# Patient Record
Sex: MALE | Race: BLACK OR AFRICAN AMERICAN | NOT HISPANIC OR LATINO | Employment: UNEMPLOYED | URBAN - METROPOLITAN AREA
[De-identification: names, ages, dates, MRNs, and addresses within clinical notes are randomized per-mention and may not be internally consistent; named-entity substitution may affect disease eponyms.]

---
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
## 2020-10-05 ENCOUNTER — PATIENT MESSAGE (OUTPATIENT)
Dept: INTERNAL MEDICINE | Facility: CLINIC | Age: 20
End: 2020-10-05

## 2021-01-05 ENCOUNTER — PATIENT MESSAGE (OUTPATIENT)
Dept: ADMINISTRATIVE | Facility: HOSPITAL | Age: 21
End: 2021-01-05

## 2021-02-22 ENCOUNTER — HOSPITAL ENCOUNTER (EMERGENCY)
Facility: HOSPITAL | Age: 21
Discharge: HOME | End: 2021-02-22
Attending: EMERGENCY MEDICINE

## 2021-02-22 VITALS
RESPIRATION RATE: 16 BRPM | OXYGEN SATURATION: 99 % | TEMPERATURE: 99 F | WEIGHT: 140 LBS | BODY MASS INDEX: 23.32 KG/M2 | HEART RATE: 97 BPM | SYSTOLIC BLOOD PRESSURE: 122 MMHG | HEIGHT: 65 IN | DIASTOLIC BLOOD PRESSURE: 73 MMHG

## 2021-02-22 DIAGNOSIS — N34.2 URETHRITIS: Primary | ICD-10-CM

## 2021-02-22 LAB
BACTERIA #/AREA URNS AUTO: ABNORMAL /HPF
BILIRUB UR QL STRIP: NEGATIVE
CLARITY UR REFRACT.AUTO: ABNORMAL
COLOR UR AUTO: YELLOW
GLUCOSE UR QL STRIP: NEGATIVE
HGB UR QL STRIP: ABNORMAL
KETONES UR QL STRIP: NEGATIVE
LEUKOCYTE ESTERASE UR QL STRIP: ABNORMAL
MICROSCOPIC COMMENT: ABNORMAL
NITRITE UR QL STRIP: NEGATIVE
PH UR STRIP: 8 [PH] (ref 5–8)
PROT UR QL STRIP: NEGATIVE
RBC #/AREA URNS AUTO: 8 /HPF (ref 0–4)
SP GR UR STRIP: 1.01 (ref 1–1.03)
URN SPEC COLLECT METH UR: ABNORMAL
UROBILINOGEN UR STRIP-ACNC: NEGATIVE EU/DL
WBC #/AREA URNS AUTO: >100 /HPF (ref 0–5)

## 2021-02-22 PROCEDURE — 96372 THER/PROPH/DIAG INJ SC/IM: CPT | Mod: ER

## 2021-02-22 PROCEDURE — 87086 URINE CULTURE/COLONY COUNT: CPT | Mod: ER

## 2021-02-22 PROCEDURE — 87491 CHLMYD TRACH DNA AMP PROBE: CPT | Mod: ER

## 2021-02-22 PROCEDURE — 63600175 PHARM REV CODE 636 W HCPCS: Mod: ER | Performed by: PHYSICIAN ASSISTANT

## 2021-02-22 PROCEDURE — 99284 EMERGENCY DEPT VISIT MOD MDM: CPT | Mod: 25,ER

## 2021-02-22 PROCEDURE — 81000 URINALYSIS NONAUTO W/SCOPE: CPT | Mod: ER

## 2021-02-22 PROCEDURE — 87591 N.GONORRHOEAE DNA AMP PROB: CPT | Mod: ER

## 2021-02-22 RX ORDER — CEFTRIAXONE 250 MG/1
500 INJECTION, POWDER, FOR SOLUTION INTRAMUSCULAR; INTRAVENOUS
Status: COMPLETED | OUTPATIENT
Start: 2021-02-22 | End: 2021-02-22

## 2021-02-22 RX ORDER — DOXYCYCLINE 100 MG/1
100 CAPSULE ORAL 2 TIMES DAILY
Qty: 14 CAPSULE | Refills: 0 | Status: SHIPPED | OUTPATIENT
Start: 2021-02-22 | End: 2021-03-01

## 2021-02-22 RX ADMIN — CEFTRIAXONE SODIUM 500 MG: 250 INJECTION, POWDER, FOR SOLUTION INTRAMUSCULAR; INTRAVENOUS at 12:02

## 2021-02-23 LAB
C TRACH DNA SPEC QL NAA+PROBE: DETECTED
N GONORRHOEA DNA SPEC QL NAA+PROBE: DETECTED

## 2021-02-24 LAB — BACTERIA UR CULT: NO GROWTH

## 2021-03-20 NOTE — SUBJECTIVE & OBJECTIVE
"Interval History: Pt is very bizarre this morning. His mentation seems to have declined since yesterday. He barely speaks, and does not respond to most questions. He contracts his right bicep for one minute straight while intently staring at it. Pt making bizarre hand gestures. Reported AH to collegue.    Pt became hypotensive yesterday (90s/50s), resolved today.     Family History     Problem Relation (Age of Onset)    Heart attacks under age 50 Paternal Uncle    No Known Problems Mother, Father, Sister, Brother        Social History Main Topics    Smoking status: Never Smoker    Smokeless tobacco: Never Used    Alcohol use No    Drug use: No    Sexual activity: Not on file     Psychotherapeutics     Start     Stop Route Frequency Ordered    02/19/18 2100  risperiDONE tablet 0.5 mg      -- Oral Nightly 02/19/18 1751    02/19/18 1852  OLANZapine tablet 2.5 mg      -- Oral Every 8 hours PRN 02/19/18 1752           Review of Systems  Objective:     Vital Signs (Most Recent):  Temp: 98.6 °F (37 °C) (02/20/18 1208)  Pulse: (!) 133 (02/20/18 1208)  Resp: 18 (02/20/18 1208)  BP: 132/77 (02/20/18 1208)  SpO2: 96 % (02/20/18 1208) Vital Signs (24h Range):  Temp:  [97.8 °F (36.6 °C)-98.9 °F (37.2 °C)] 98.6 °F (37 °C)  Pulse:  [] 133  Resp:  [16-20] 18  SpO2:  [96 %-99 %] 96 %  BP: ()/(50-78) 132/77     Height: 5' 7" (170.2 cm)  Weight: 55 kg (121 lb 4.1 oz)  Body mass index is 18.99 kg/m².    No intake or output data in the 24 hours ending 02/20/18 1220    Physical Exam   Appearance: appears fearful, in hospital scrubs, padded bed railing  Behavior: minimal cooperation today, bizarre body gestures and movements  Speech: soft, slow, minimal, increased latency  Mood: EN  Affect: bizarre  Thought Process: disorganized  Thought Perceptions: +AH  Thought Content: +paranoia, +delusions  Sensorium: awake, alert  Attention/Concentration: impaired  Orientation: NE  Memory: EN  Abstraction: EN  Insight: " impaired  Judgment: impaired       Significant Labs:   Recent Results (from the past 48 hour(s))   IgA    Collection Time: 02/18/18  1:09 PM   Result Value Ref Range    IgA 275 40 - 350 mg/dL   CSF cell count with differential    Collection Time: 02/18/18  1:10 PM   Result Value Ref Range    Heme Aliquot 1.0 mL    Appearance, CSF Slightly bloody (A) Clear    Color, CSF Xanthochromic (A) Colorless    WBC, CSF 9 (H) 0 - 5 /cu mm    RBC,  (A) 0 /cu mm    Segmented Neutrophils, CSF 1 0 - 6 %    Lymphs, CSF 96 (H) 40 - 80 %    Mono/Macrophage, CSF 3 (L) 15 - 45 %   CSF cell count with differential    Collection Time: 02/18/18  1:10 PM   Result Value Ref Range    Heme Aliquot 1.0 mL    Appearance, CSF Clear Clear    Color, CSF Colorless Colorless    WBC, CSF 7 (H) 0 - 5 /cu mm    RBC, CSF 7 (A) 0 /cu mm    Segmented Neutrophils, CSF 2 0 - 6 %    Lymphs, CSF 94 (H) 40 - 80 %    Mono/Macrophage, CSF 4 (L) 15 - 45 %   Glucose, CSF    Collection Time: 02/18/18  1:10 PM   Result Value Ref Range    Glucose, CSF 49 40 - 70 mg/dL   Protein, CSF    Collection Time: 02/18/18  1:10 PM   Result Value Ref Range    Protein, CSF 37 15 - 40 mg/dL   CSF culture    Collection Time: 02/18/18  1:10 PM   Result Value Ref Range    CSF CULTURE No Growth to date     Gram Stain Result Rare WBC's     Gram Stain Result No organisms seen    Cryptococcal antigen, CSF    Collection Time: 02/18/18  1:10 PM   Result Value Ref Range    Crypto Ag, CSF Negative    Basic metabolic panel    Collection Time: 02/19/18  5:01 AM   Result Value Ref Range    Sodium 139 136 - 145 mmol/L    Potassium 4.6 3.5 - 5.1 mmol/L    Chloride 105 95 - 110 mmol/L    CO2 22 (L) 23 - 29 mmol/L    Glucose 56 (L) 70 - 110 mg/dL    BUN, Bld 17 6 - 20 mg/dL    Creatinine 0.9 0.5 - 1.4 mg/dL    Calcium 9.5 8.7 - 10.5 mg/dL    Anion Gap 12 8 - 16 mmol/L    eGFR if African American >60.0 >60 mL/min/1.73 m^2    eGFR if non African American >60.0 >60 mL/min/1.73 m^2   CBC auto  differential    Collection Time: 02/19/18  5:01 AM   Result Value Ref Range    WBC 6.99 3.90 - 12.70 K/uL    RBC 4.60 4.60 - 6.20 M/uL    Hemoglobin 12.1 (L) 14.0 - 18.0 g/dL    Hematocrit 37.2 (L) 40.0 - 54.0 %    MCV 81 (L) 82 - 98 fL    MCH 26.3 (L) 27.0 - 31.0 pg    MCHC 32.5 32.0 - 36.0 g/dL    RDW 12.4 11.5 - 14.5 %    Platelets 361 (H) 150 - 350 K/uL    MPV 9.0 (L) 9.2 - 12.9 fL    Immature Granulocytes 0.4 0.0 - 0.5 %    Gran # (ANC) 5.1 1.8 - 7.7 K/uL    Immature Grans (Abs) 0.03 0.00 - 0.04 K/uL    Lymph # 1.2 1.0 - 4.8 K/uL    Mono # 0.5 0.3 - 1.0 K/uL    Eos # 0.1 0.0 - 0.5 K/uL    Baso # 0.03 0.00 - 0.20 K/uL    nRBC 0 0 /100 WBC    Gran% 73.2 (H) 38.0 - 73.0 %    Lymph% 17.0 (L) 18.0 - 48.0 %    Mono% 7.7 4.0 - 15.0 %    Eosinophil% 1.3 0.0 - 8.0 %    Basophil% 0.4 0.0 - 1.9 %    Differential Method Automated    Magnesium    Collection Time: 02/19/18  5:01 AM   Result Value Ref Range    Magnesium 2.2 1.6 - 2.6 mg/dL      No results found for: PHENYTOIN, PHENOBARB, VALPROATE, CBMZ      Significant Imaging: I have reviewed all pertinent imaging results/findings within the past 24 hours.   No

## 2021-05-10 ENCOUNTER — PATIENT MESSAGE (OUTPATIENT)
Dept: FAMILY MEDICINE | Facility: CLINIC | Age: 21
End: 2021-05-10

## 2021-05-11 ENCOUNTER — OFFICE VISIT (OUTPATIENT)
Dept: FAMILY MEDICINE | Facility: CLINIC | Age: 21
End: 2021-05-11
Payer: COMMERCIAL

## 2021-05-11 VITALS
OXYGEN SATURATION: 98 % | SYSTOLIC BLOOD PRESSURE: 108 MMHG | WEIGHT: 165.38 LBS | HEIGHT: 65 IN | HEART RATE: 97 BPM | BODY MASS INDEX: 27.56 KG/M2 | DIASTOLIC BLOOD PRESSURE: 66 MMHG | TEMPERATURE: 98 F

## 2021-05-11 DIAGNOSIS — G40.909 SEIZURE DISORDER: Primary | ICD-10-CM

## 2021-05-11 PROCEDURE — 99213 PR OFFICE/OUTPT VISIT, EST, LEVL III, 20-29 MIN: ICD-10-PCS | Mod: S$GLB,,, | Performed by: FAMILY MEDICINE

## 2021-05-11 PROCEDURE — 99213 OFFICE O/P EST LOW 20 MIN: CPT | Mod: S$GLB,,, | Performed by: FAMILY MEDICINE

## 2021-05-15 ENCOUNTER — TELEPHONE (OUTPATIENT)
Dept: FAMILY MEDICINE | Facility: CLINIC | Age: 21
End: 2021-05-15

## 2021-05-15 RX ORDER — LACOSAMIDE 50 MG/1
100 TABLET ORAL EVERY 12 HOURS
Qty: 120 TABLET | Refills: 2 | Status: SHIPPED | OUTPATIENT
Start: 2021-05-15 | End: 2021-09-20 | Stop reason: SDUPTHER

## 2021-09-20 ENCOUNTER — TELEPHONE (OUTPATIENT)
Dept: FAMILY MEDICINE | Facility: CLINIC | Age: 21
End: 2021-09-20

## 2021-09-20 RX ORDER — LACOSAMIDE 50 MG/1
100 TABLET ORAL EVERY 12 HOURS
Qty: 120 TABLET | Refills: 2 | Status: SHIPPED | OUTPATIENT
Start: 2021-09-20 | End: 2022-03-26 | Stop reason: SDUPTHER

## 2021-11-08 NOTE — PROGRESS NOTES
After discussion w/ MD Stein on call with Psychiatry of concerns of pt's behavior, PRN zyprexa administered at this time. Pt exhibiting bizarre gaze and making off the wall comments that are random and out of blue. Pt denies Auditory/visible  Hallucinations. Pt appearing very anxious/paranoid. IVIG recently initiated. WCTM.     08-Nov-2021 10:20

## 2022-01-05 ENCOUNTER — HOSPITAL ENCOUNTER (EMERGENCY)
Facility: HOSPITAL | Age: 22
Discharge: HOME | End: 2022-01-05
Attending: EMERGENCY MEDICINE

## 2022-01-05 VITALS
DIASTOLIC BLOOD PRESSURE: 54 MMHG | BODY MASS INDEX: 23.32 KG/M2 | TEMPERATURE: 100 F | RESPIRATION RATE: 19 BRPM | OXYGEN SATURATION: 95 % | SYSTOLIC BLOOD PRESSURE: 112 MMHG | WEIGHT: 140 LBS | HEART RATE: 97 BPM | HEIGHT: 65 IN

## 2022-01-05 DIAGNOSIS — R51.9 NONINTRACTABLE EPISODIC HEADACHE, UNSPECIFIED HEADACHE TYPE: ICD-10-CM

## 2022-01-05 DIAGNOSIS — R50.9 FEVER, UNSPECIFIED FEVER CAUSE: ICD-10-CM

## 2022-01-05 DIAGNOSIS — B34.9 ACUTE VIRAL SYNDROME: Primary | ICD-10-CM

## 2022-01-05 LAB
INFLUENZA A, MOLECULAR: NEGATIVE
INFLUENZA B, MOLECULAR: NEGATIVE
SPECIMEN SOURCE: NORMAL

## 2022-01-05 PROCEDURE — U0005 INFEC AGEN DETEC AMPLI PROBE: HCPCS | Performed by: EMERGENCY MEDICINE

## 2022-01-05 PROCEDURE — 99284 EMERGENCY DEPT VISIT MOD MDM: CPT | Mod: 25,ER

## 2022-01-05 PROCEDURE — U0003 INFECTIOUS AGENT DETECTION BY NUCLEIC ACID (DNA OR RNA); SEVERE ACUTE RESPIRATORY SYNDROME CORONAVIRUS 2 (SARS-COV-2) (CORONAVIRUS DISEASE [COVID-19]), AMPLIFIED PROBE TECHNIQUE, MAKING USE OF HIGH THROUGHPUT TECHNOLOGIES AS DESCRIBED BY CMS-2020-01-R: HCPCS | Mod: ER | Performed by: EMERGENCY MEDICINE

## 2022-01-05 PROCEDURE — 87502 INFLUENZA DNA AMP PROBE: CPT | Mod: ER | Performed by: EMERGENCY MEDICINE

## 2022-01-05 PROCEDURE — 96372 THER/PROPH/DIAG INJ SC/IM: CPT | Mod: ER

## 2022-01-05 PROCEDURE — 25000003 PHARM REV CODE 250: Mod: ER | Performed by: EMERGENCY MEDICINE

## 2022-01-05 PROCEDURE — 63600175 PHARM REV CODE 636 W HCPCS: Mod: ER | Performed by: EMERGENCY MEDICINE

## 2022-01-05 RX ORDER — PROCHLORPERAZINE EDISYLATE 5 MG/ML
10 INJECTION INTRAMUSCULAR; INTRAVENOUS
Status: COMPLETED | OUTPATIENT
Start: 2022-01-05 | End: 2022-01-05

## 2022-01-05 RX ORDER — KETOROLAC TROMETHAMINE 30 MG/ML
10 INJECTION, SOLUTION INTRAMUSCULAR; INTRAVENOUS
Status: COMPLETED | OUTPATIENT
Start: 2022-01-05 | End: 2022-01-05

## 2022-01-05 RX ORDER — ONDANSETRON 4 MG/1
4 TABLET, ORALLY DISINTEGRATING ORAL EVERY 6 HOURS PRN
Qty: 12 TABLET | Refills: 0 | Status: SHIPPED | OUTPATIENT
Start: 2022-01-05 | End: 2022-07-25

## 2022-01-05 RX ORDER — PROCHLORPERAZINE EDISYLATE 5 MG/ML
2.5 INJECTION INTRAMUSCULAR; INTRAVENOUS
Status: DISCONTINUED | OUTPATIENT
Start: 2022-01-05 | End: 2022-01-05

## 2022-01-05 RX ORDER — DIPHENHYDRAMINE HCL 25 MG
25 CAPSULE ORAL
Status: COMPLETED | OUTPATIENT
Start: 2022-01-05 | End: 2022-01-05

## 2022-01-05 RX ADMIN — KETOROLAC TROMETHAMINE 10 MG: 30 INJECTION, SOLUTION INTRAMUSCULAR at 01:01

## 2022-01-05 RX ADMIN — DIPHENHYDRAMINE HYDROCHLORIDE 25 MG: 25 CAPSULE ORAL at 01:01

## 2022-01-05 RX ADMIN — PROCHLORPERAZINE EDISYLATE 10 MG: 5 INJECTION INTRAMUSCULAR; INTRAVENOUS at 01:01

## 2022-01-05 NOTE — Clinical Note
"David Koenig"Neelam was seen and treated in our emergency department on 1/5/2022.     COVID-19 is present in our communities across the state. There is limited testing for COVID at this time, so not all patients can be tested. In this situation, your employee meets the following criteria:    David Richter Jr. has met the criteria for COVID-19 testing based upon symptoms, travel, and/or potential exposure. The test has been completed and is pending results at this time. During this time the employee is not able to work and should be quarantined per the Centers for Disease Control timelines.     If you have any questions or concerns, or if I can be of further assistance, please do not hesitate to contact me.    Sincerely,             Jorge A Lambert MD"

## 2022-01-05 NOTE — ED PROVIDER NOTES
Encounter Date: 1/5/2022       History     Chief Complaint   Patient presents with    Headache     Pt presents to ED with C/O HA, chills, fever, extremity pain to BUE, BLE     Patient currently presents with concern regarding viral symptoms.  Onset noted yesterday.  Symptoms include chills, fever, headache and myalgias.  There Is not anosmia/ageusia noted.  Patient denies associated SOB.  Patient Is not aware of recent ill contacts.  Diarrhea and vomiting are not reported.        Review of patient's allergies indicates:   Allergen Reactions    Iodine Rash    Iodine and iodide containing products Rash     Past Medical History:   Diagnosis Date    Allergy     Asthma     Seizures      Past Surgical History:   Procedure Laterality Date    INNER EAR SURGERY      glass shard in ear removed     Family History   Problem Relation Age of Onset    No Known Problems Mother     No Known Problems Father     No Known Problems Sister     No Known Problems Brother     Heart attacks under age 50 Paternal Uncle      Social History     Tobacco Use    Smoking status: Current Some Day Smoker     Types: Vaping w/o nicotine    Smokeless tobacco: Never Used   Substance Use Topics    Alcohol use: No    Drug use: No     Review of Systems   Constitutional: Positive for chills and fever.   HENT: Negative for congestion and sore throat.    Respiratory: Negative for chest tightness and shortness of breath.    Cardiovascular: Negative for chest pain and palpitations.   Gastrointestinal: Negative for abdominal pain and vomiting.   Genitourinary: Negative for difficulty urinating and dysuria.   Musculoskeletal: Positive for myalgias.   Skin: Negative for color change and rash.   Allergic/Immunologic: Negative for immunocompromised state.   Neurological: Positive for headaches. Negative for weakness and numbness.   Hematological: Negative for adenopathy.   All other systems reviewed and are negative.    Physical Exam     Initial  "Vitals [01/05/22 0024]   BP Pulse Resp Temp SpO2   109/63 108 19 (!) 100.5 °F (38.1 °C) 98 %      MAP       --         Vitals:    01/05/22 0024 01/05/22 0149 01/05/22 0154   BP: 109/63 (!) 112/54    Pulse: 108 97    Resp: 19     Temp: (!) 100.5 °F (38.1 °C)  100.3 °F (37.9 °C)   TempSrc: Oral  Oral   SpO2: 98% 95%    Weight: 63.5 kg (140 lb)     Height: 5' 5" (1.651 m)       Physical Exam    Constitutional: He appears well-developed and well-nourished. He is not diaphoretic. No distress.   HENT:   Head: Normocephalic and atraumatic.   Right Ear: External ear normal.   Left Ear: External ear normal.   Nose: Nose normal.   Mouth/Throat: Oropharynx is clear and moist.   Eyes: Conjunctivae and EOM are normal. Pupils are equal, round, and reactive to light. No scleral icterus.   Neck: Neck supple. No JVD present.   Cardiovascular: Normal rate, regular rhythm, normal heart sounds and intact distal pulses.   Pulmonary/Chest: Breath sounds normal. No respiratory distress.   Abdominal: Abdomen is soft. There is no abdominal tenderness.   Musculoskeletal:      Cervical back: Neck supple.     Lymphadenopathy:     He has no cervical adenopathy.   Neurological: He is alert and oriented to person, place, and time.   Skin: Skin is warm and dry.       ED Course   Procedures  Labs Reviewed   INFLUENZA A & B BY MOLECULAR   SARS-COV-2 (COVID-19) QUALITATIVE PCR          Imaging Results    None          Medications   ketorolac injection 9.999 mg (9.999 mg Intramuscular Given 1/5/22 0110)   prochlorperazine injection Soln 10 mg (10 mg Intramuscular Given 1/5/22 0109)   diphenhydrAMINE capsule 25 mg (25 mg Oral Given 1/5/22 0109)     Medical Decision Making:   ED Management:  All findings were reviewed with the patient/family in detail.  I see no indication of an emergent process beyond that addressed during our encounter but have duly counseled the patient/family regarding the need for prompt follow-up as well as the indications that " should prompt immediate return to the emergency room should new or worrisome developments occur.  The patient has additionally been provided with printed information regarding diagnosis as well as instructions regarding follow up and any medications intended to manage the patient's aforementioned conditions.  The patient/family communicates understanding of all this information and all remaining questions and concerns were addressed at this time.                            Clinical Impression:   Final diagnoses:  [B34.9] Acute viral syndrome (Primary)  [R51.9] Nonintractable episodic headache, unspecified headache type  [R50.9] Fever, unspecified fever cause          ED Disposition Condition    Discharge Stable        ED Prescriptions     Medication Sig Dispense Start Date End Date Auth. Provider    ondansetron (ZOFRAN-ODT) 4 MG TbDL Take 1 tablet (4 mg total) by mouth every 6 (six) hours as needed (nausea). 12 tablet 1/5/2022  Jorge A Lambert MD        Follow-up Information     Follow up With Specialties Details Why Contact Info    Renan Choi MD Family Medicine Schedule an appointment as soon as possible for a visit  for reassessment [address] [place] [address] [id number] [phone]      [facility] - Emergency Dept Emergency Medicine Go to  As needed, If symptoms worsen [address] [facility] [id number] [phone]           Jorge A Lambert MD  01/05/22 0330

## 2022-01-06 LAB
SARS-COV-2 RNA RESP QL NAA+PROBE: DETECTED
SARS-COV-2- CYCLE NUMBER: 18

## 2022-03-24 NOTE — TELEPHONE ENCOUNTER
No new care gaps identified.  Powered by [employer]. Reference number: [id number].   3/24/2022 11:35:12 AM CDT

## 2022-03-24 NOTE — TELEPHONE ENCOUNTER
No new care gaps identified.  Powered by [employer]. Reference number: [id number].   3/24/2022 1:30:28 PM CDT

## 2022-03-24 NOTE — TELEPHONE ENCOUNTER
----- Message from Faby Beavers sent at 3/24/2022 11:47 AM CDT -----  Type: Requesting to speak with nurse         Who Called: PT's dad  Sr  Regarding: pt dad calling about seizure medication lacosamide (VIMPAT) 50 mg Tab- he has been out a while- please advise  Would the patient rather a call back or a response via [facility]? Call back  Best Call Back Number: [phone]  Additional Information: [facility] [address] [place] [address] [phone]

## 2022-03-25 ENCOUNTER — TELEPHONE (OUTPATIENT)
Dept: FAMILY MEDICINE | Facility: CLINIC | Age: 22
End: 2022-03-25
Payer: COMMERCIAL

## 2022-03-25 NOTE — TELEPHONE ENCOUNTER
----- Message from Virgie Gutierrez sent at 3/25/2022  9:08 AM CDT -----  Contact: ( [phone]  Type:  Needs Medical Advice    Who Called: Pt's Father  Reason for call: regarding a Refill on lacosamide (VIMPAT) 50 mg Tab   Pharmacy name and phone #:  [facility] [address] [place] [address] [address] [facility]  Would the patient rather a call back or a response via [facility]?  Call back  Best Call Back Number: [phone]

## 2022-03-25 NOTE — TELEPHONE ENCOUNTER
Pt's father has been notified and verbalized understanding that refill request has been pended to provider for approval.

## 2022-03-26 ENCOUNTER — TELEPHONE (OUTPATIENT)
Dept: FAMILY MEDICINE | Facility: CLINIC | Age: 22
End: 2022-03-26
Payer: COMMERCIAL

## 2022-03-26 DIAGNOSIS — G40.909 SEIZURE DISORDER: Primary | ICD-10-CM

## 2022-03-26 RX ORDER — LACOSAMIDE 50 MG/1
100 TABLET ORAL EVERY 12 HOURS
Qty: 120 TABLET | Refills: 11 | Status: SHIPPED | OUTPATIENT
Start: 2022-03-26

## 2022-03-28 RX ORDER — LACOSAMIDE 50 MG/1
100 TABLET ORAL EVERY 12 HOURS
Qty: 120 TABLET | Refills: 2 | OUTPATIENT
Start: 2022-03-28

## 2022-03-28 RX ORDER — LACOSAMIDE 50 MG/1
TABLET, FILM COATED ORAL
Qty: 120 TABLET | OUTPATIENT
Start: 2022-03-28

## 2022-05-31 ENCOUNTER — PATIENT MESSAGE (OUTPATIENT)
Dept: ADMINISTRATIVE | Facility: HOSPITAL | Age: 22
End: 2022-05-31
Payer: COMMERCIAL

## 2022-07-25 ENCOUNTER — OFFICE VISIT (OUTPATIENT)
Dept: NEUROLOGY | Facility: CLINIC | Age: 22
End: 2022-07-25
Payer: COMMERCIAL

## 2022-07-25 ENCOUNTER — PATIENT MESSAGE (OUTPATIENT)
Dept: FAMILY MEDICINE | Facility: CLINIC | Age: 22
End: 2022-07-25
Payer: COMMERCIAL

## 2022-07-25 ENCOUNTER — PATIENT MESSAGE (OUTPATIENT)
Dept: NEUROLOGY | Facility: CLINIC | Age: 22
End: 2022-07-25

## 2022-07-25 ENCOUNTER — TELEPHONE (OUTPATIENT)
Dept: NEUROLOGY | Facility: CLINIC | Age: 22
End: 2022-07-25

## 2022-07-25 VITALS
BODY MASS INDEX: 23.32 KG/M2 | WEIGHT: 140 LBS | DIASTOLIC BLOOD PRESSURE: 68 MMHG | HEIGHT: 65 IN | HEART RATE: 75 BPM | SYSTOLIC BLOOD PRESSURE: 108 MMHG

## 2022-07-25 DIAGNOSIS — G04.81 ANTI-N-METHYL-D-ASPARTATE (NMDA) RECEPTOR ENCEPHALITIS: Primary | ICD-10-CM

## 2022-07-25 DIAGNOSIS — G40.909 SEIZURE DISORDER: ICD-10-CM

## 2022-07-25 PROBLEM — F29 PSYCHOSIS: Status: RESOLVED | Noted: 2018-02-17 | Resolved: 2022-07-25

## 2022-07-25 PROBLEM — G40.009 PARTIAL IDIOPATHIC EPILEPSY WITH SEIZURES OF LOCALIZED ONSET, NOT INTRACTABLE, WITHOUT STATUS EPILEPTICUS: Status: ACTIVE | Noted: 2018-02-22

## 2022-07-25 PROBLEM — Z73.89: Status: RESOLVED | Noted: 2018-01-24 | Resolved: 2022-07-25

## 2022-07-25 PROCEDURE — 99204 PR OFFICE/OUTPT VISIT, NEW, LEVL IV, 45-59 MIN: ICD-10-PCS | Mod: S$GLB,,, | Performed by: PSYCHIATRY & NEUROLOGY

## 2022-07-25 PROCEDURE — 99999 PR PBB SHADOW E&M-EST. PATIENT-LVL IV: ICD-10-PCS | Mod: PBBFAC,,, | Performed by: PSYCHIATRY & NEUROLOGY

## 2022-07-25 PROCEDURE — 99204 OFFICE O/P NEW MOD 45 MIN: CPT | Mod: S$GLB,,, | Performed by: PSYCHIATRY & NEUROLOGY

## 2022-07-25 PROCEDURE — 99999 PR PBB SHADOW E&M-EST. PATIENT-LVL IV: CPT | Mod: PBBFAC,,, | Performed by: PSYCHIATRY & NEUROLOGY

## 2022-07-25 NOTE — PROGRESS NOTES
"[facility] NEUROLOGY  [facility], [place]    Date: 7/25/22  Patient Name: David Richter Jr.   MRN: [id number]   PCP: Renan Choi  Referring Provider: Renan Choi MD    Chief Complaint: epilepsy  Subjective:   Patient seen in consultation at the request of Renan Choi MD for the evaluation of the above chief complaint. A copy of this note will be sent to the referring physician.      HPI:   Mr. David Richter Jr. is a 21 y.o. male presenting to establish care for history of epilepsy and history of anti NMDA receptor encephalitis.  This patient has been followed by [facility] neurology in the past.  Extensive chart review conducted prior to today's encounter including exert from epileptologist's note listed below.    Patient began having seizure activity and psychiatric symptoms in early 2018. Subsequent workup included abnormal paraneoplastic panel confirm diagnosis.  The patient underwent 2 rounds of IVIG and was started on seizure medication after left-sided focal slowing was identified along with several episodes of generalized tonic clonic seizure activity.  After treatment with IVIG, the patient has been well controlled in the outpatient setting.  He presents today as he has had no seizure activity in greater than 2 years and wishes to discuss weaning antiepileptic regimen if possible.  Tolerating Vimpat 100 mg twice daily with no noted side effects.  Some compliance issues with missed doses.    Confirms semiology as described below. No fevers, unexplained weight loss, night sweats, hallucinations, delusions, sleep disturbance.    ===============================================================  Per previous neurology documentation on 10/10/2018 by Dr. Hankins (cosigner is Guadalupe Kennedy MD, [facility] epileptologist):  "17 yr old male with asthma who presents as a transfer from an outside hospital for evaluation of seizures and behavioral change.   History obtained from " "patient's father. Prior to [place] time in 2017, patient reported to not have had any neurologic or psychiatric symptoms. Since Christmas, father reports atleast 10 events where he was taken to the local ED for GTC. Most recently, he was seen at a behavioral health center for delusional thoughts. Per chart review - he was reported to have been "looking around at the walls, and watching something in the room that wasn't there. He was not experiencing HI/SI, but was having delusions (thought people were coming to kill him)". No prior psychiatric history.      Per chart review, he was seen at [facility] ED on 01/17/2018 after having an event that was described as generalized shaking with LOC and confusion after. Per the father, who witnessed one of these events reports that it seems to start with contraction and shaking of the right side with head deviated to the right followed by contraction of the left side and LOC. It has in the past been associated with tongue biting and bowel incontinence. Event lasts about 1 min followed by confusion, lethargy and muscle soreness lasting anywhere from 10 min to an hour.   Pt lives with the father, however more recently has been seeing his mother more often since christmas with reported concerns by the father of drug abuse by his mother however insists that patient does not use street or prescription drugs. Tox screen here negative. On 2/2/18, he was seen by Dr. Markham with Neurology who ordered an MRI brain, EEG and started patient on Keppra 500mg bid. MRI brain - done with visualization of the medial temporal lobes did not show any abnormalities.      Since admit, patient reported to be intermittently confused, having tangential thoughts and delusions. He is currently PEC'd and psychiatry is consulted".     Patient was admitted. Workup included LP and 24 hr vEEG. LP remarkable for mild lymphocytic pleocytosis (WBC 7), normal protein. Patient was treated with acyclovir for " "suspected viral encephalitis. HSV PCR negative. EEG was remarkable for left sided slowing without epileptiform potentials. He was treated then with IVIg (total of 2g/kg) for suspected autoimmune encephalitis. Psychiatry was consulted; started on risperidol. He was discharged home after course of IVIg with some improvement in symptoms however not back at baseline. Post discharge, paraneoplastic panel was remarkable for NMDA antibodies - he was readmitted to the hospital and was administered another course of IVIg (2g/kg total dose over 3 days). MOCA done in the hospital reported at 16 and 23.   Since discharge, father reported improvement in psychiatric symptoms. Denies GTC however pt reports 2 episodes of patchy numbness on the right arm and leg but did not generalize. Patient still however not at baseline. Currently reported to be taking vimpat 50mg BID and risperidol 1mg BID. Originally prescribed vimpat 100mg BID on discharge. No side effects reported to vimpat. Started attending school 2 days prior to office visit. Workup with CT abd/pel/chest and US scrotum unremarkable for masses. "  ===============================================================    PAST MEDICAL HISTORY:  Past Medical History:   Diagnosis Date    Allergy     Asthma     Seizures        PAST SURGICAL HISTORY:  Past Surgical History:   Procedure Laterality Date    INNER EAR SURGERY      glass shard in ear removed       CURRENT MEDS:  Current Outpatient Medications   Medication Sig Dispense Refill    lacosamide (VIMPAT) 50 mg Tab Take 2 tablets (100 mg total) by mouth every 12 (twelve) hours. 120 tablet 11    PROAIR HFA 90 mcg/actuation inhaler INHALE 2 PUFFS INTO THE LUNGS EVERY 6 (SIX) HOURS AS NEEDED FOR WHEEZING. 8.5 Inhaler 0     No current facility-administered medications for this visit.       ALLERGIES:  Review of patient's allergies indicates:   Allergen Reactions    Iodine Rash    Iodine and iodide containing products Rash " "      FAMILY HISTORY:  Family History   Problem Relation Age of Onset    No Known Problems Mother     No Known Problems Father     No Known Problems Sister     No Known Problems Brother     Heart attacks under age 50 Paternal Uncle        SOCIAL HISTORY:  Social History     Tobacco Use    Smoking status: Current Some Day Smoker     Types: Vaping w/o nicotine    Smokeless tobacco: Never Used   Substance Use Topics    Alcohol use: No    Drug use: No       Review of Systems:  Gen: no fever, no chills, no generalized feeling of weakness   HEENT: no double vision, no blurred vision, no eye pain, no eye exudates.   Heart: no chest pain, no SOB    Lungs: no SOB, no cough    MSK: no weakness of legs, intact ROM    ABD: no abd pain, no N/V/D/C, no difficulty with defecation.    Extremities: No leg pain, no edema.       Objective:     Vitals:    07/25/22 0810   BP: 108/68   Pulse: 75   Weight: 63.5 kg (139 lb 15.9 oz)   Height: 5' 5" (1.651 m)     General: male in NAD, alert and awake, Aox3, well groomed. ?    ? ?    HEENT: Head is NC/AT EOMI, pupil size: 4 mm B/L, no nystagmus noted; hearing grossly intact b/l. Mucous membrane moist, uvula midline, no pharyngeal erythema, exudates or discharges.      Neck: Supple. no nuchal rigidity.      Cardiovascular: well perfused, no cyanosis        Respiratory: Symmetric chest rise noted       Musculoskeletal: Muscle tone noted to be adequate for patient age, muscle mass is WNL. No spontaneous movements or fasciculations noted during this examination.       Extremities: No pedal edema or calf tenderness. No cogwheel rigidity noted on B/L UE extremities.       Neurological Examination.    Mental status: AA&O x3; Affect/mood is euthymic/congruent; no aphasia noted during examination. Patient answers simple questions appropriately & follows simple commands; no dysarthria or expressive aphasia; no adam-neglect or extinction. Vocabulary/word finding: excellent.       Cranial " "Nerves: II-XII grossly intact.      Muscle Function: Tone WNL and Muscle bulk WNL. 5/5 throughout     Sensory: intact to light touch throughout     Reflexes: Left and Right biceps, triceps, brachioradialis are 2+/4. patellar 2+/4 on Left and 2+/4 on Right, B/L Achilles 2+/4     Coordination: no dysmetria (finger to nose negative)     Gait: adequate casual gait with stride length and arm swing WNL.     Other:  02/17/2018 MRI brain with/without contrast:  IMPRESSION:   Mildly motion degraded examination was no significant abnormality."    11/05/2018 EEG:  "Normal awake, drowsy and sleep EEG."    Assessment:   David Richter Jr. is a 21 y.o. male presenting to [facility] care for epilepsy in the setting of history of NMDA receptor encephalitis.  Patient has been seizure-free for greater than 2 years.  He wishes to be evaluated for possible discontinuation of antiepileptic regimen.  We will initiate new EEG and MRI brain.  In reviewing relevant research available on the topic of discontinuation of AEDs in this patient population, success rates can be quite low, so we will proceed very cautiously.  Extensive conversation and recommendations regarding seizure safety topics discussed at the time of the encounter including the avoidance of driving within 6 months of any suspected seizure activity.    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   CSF paraneoplastic panel positive for NMDAR aB ON 2/18           Relevant Orders    MRI Brain Epilepsy Without Contrast    EEG,w/awake & drowsy record        - Seizure safety discussed extensively including avoidance of risky situations, large " bodies of water, swimming alone, baths. We also discussed avoiding driving or operating other heavy machinery for 6 months after a breakthrough event in the [facility].   - Advised the patient to avoid seizure provoking behaviors including excessive alcohol consumption, sleep deprivation, and medication noncompliance.   - continue current medications until test results are received and next steps in therapy are discussed  - updated MRI brain with epilepsy protocol and EEG ordered  - follow-up with our clinic in 3 months or sooner as needed.  The patient was counseled would reach out by chart message or phone regarding test results when they become available and discuss next steps at that time.    I spent a total of 45 minutes on the day of the visit. This includes face to face time and non-face to face time preparing to see the patient (eg, review of tests), obtaining and/or reviewing separately obtained history, documenting clinical information in the electronic or other health record, independently interpreting results and communicating results to the patient/family/caregiver, or care coordinator.    A dictation device was used to produce this document. Use of such devices sometimes results in grammatical errors or replacement of words that sound similarly.    Fernando Muhammad, DO

## 2022-07-25 NOTE — TELEPHONE ENCOUNTER
Called the patient but no VM available, so I messaged him through his [facility] portal to let him know the date and time of his scheduled EEG appointment for 7/28/22 at 9:30am.

## 2022-07-28 ENCOUNTER — HOSPITAL ENCOUNTER (OUTPATIENT)
Dept: NEUROLOGY | Facility: CLINIC | Age: 22
Discharge: HOME | End: 2022-07-28
Payer: COMMERCIAL

## 2022-07-28 DIAGNOSIS — G40.909 SEIZURE DISORDER: ICD-10-CM

## 2022-07-28 DIAGNOSIS — G04.81 ANTI-N-METHYL-D-ASPARTATE (NMDA) RECEPTOR ENCEPHALITIS: ICD-10-CM

## 2022-07-28 PROCEDURE — 95816 PR EEG,W/AWAKE & DROWSY RECORD: ICD-10-PCS | Mod: S$GLB,,, | Performed by: PSYCHIATRY & NEUROLOGY

## 2022-07-28 PROCEDURE — 95816 EEG AWAKE AND DROWSY: CPT | Mod: S$GLB,,, | Performed by: PSYCHIATRY & NEUROLOGY

## 2022-07-28 NOTE — PROCEDURES
Procedures   EEG REPORT      David Richter Jr.  [id number]  2000    DATE OF SERVICE: 7/28/2022         METHODOLOGY      Extended electroencephalographic recording is made while the patient is ambulatory and continuing normal daily activities.  Electrodes are placed according to the International 10-20 placement system and included T1 and T2 electrode placement.  Twenty four (24) channels of digital signal (sampling rate of 512/sec) was simultaneously recorded from the scalp including EKG and eye monitors.  Recording band pass was 0.1 to 100 hz and all data was stored digitally on the recorder.  The patient is instructed to press an event button when clinical symptoms occur and write the symptoms into a diary. Activation procedures which include photic stimulation, hyperventilation and instructing patients to perform simple task are done in selected patients.        The EEG is displayed on a monitor screen and can be reformatted into different montages for evaluation.  The entire recoding is submitted for computer assisted analysis to detect spike and electrographic seizure activity.  The entire recording is visually reviewed and the times identified by computer analysis as being spikes or seizures are reviewed again.  Compresses spectral analysis (CSA) is also performed on the activity recorded from each individual channel.  This is displayed as a power display of frequencies from 0 to 30 Hz over time.   The CSA analysis is done and displayed continuously.  This is reviewed for asymmetries in power between homologous areas of the scalp and for presence of changes in power which canbe seen when seizures occur.  Sections of suspected abnormalities on the CSA is then compared with the original EEG recording.  .     [employer] software was also utilized in the review of this study.  This software suite analyzes the EEG recording in multiple domains.  Coherence and rhythmicity is computed to identify EEG  sections which may contain organized seizures.  Each channel undergoes analysis to detect presence of spike and sharp waves which have special and morphological characteristic of epileptic activity.  The routine EEG recording is converted from spacial into frequency domain.  This is then displayed comparing homologous areas to identify areas of significant asymmetry.  Algorithm to identify non-cortically generated artifact is used to separate eye movement, EMG and other artifact from the EEG     Recording Times    A total of 00:26:20 hours of EEG was recorded.      EEG FINDINGS:  Background activity:   The background rhythm was characterized by alpha and anterior dominant beta activity with a 10Hz posterior dominant alpha rhythm at 30-70 microvolts.   Symmetry and continuity: the background was continuous and symmetric     Sleep:   Normal sleep transients including sleep spindles, K complexes, vertex waves and POSTS were seen.    Activation procedures:   Photic stimulation was performed with no abnormalities seen  Hyperventilation was performed with no abnormalities seen    Abnormal activity:   No epileptiform discharges, periodic discharges, lateralized rhythmic delta activity or electrographic seizures were seen.    IMPRESSION:   Normal EEG of light sleep and the waking state.      Renan Dubon MD  Neurology-Epilepsy.  [facility].

## 2022-08-09 ENCOUNTER — HOSPITAL ENCOUNTER (OUTPATIENT)
Dept: RADIOLOGY | Facility: HOSPITAL | Age: 22
Discharge: HOME | End: 2022-08-09
Attending: PSYCHIATRY & NEUROLOGY

## 2022-08-09 DIAGNOSIS — G40.909 SEIZURE DISORDER: ICD-10-CM

## 2022-08-09 DIAGNOSIS — G04.81 ANTI-N-METHYL-D-ASPARTATE (NMDA) RECEPTOR ENCEPHALITIS: ICD-10-CM

## 2022-08-09 PROCEDURE — 70551 MRI BRAIN STEM W/O DYE: CPT | Mod: 26,,, | Performed by: RADIOLOGY

## 2022-08-09 PROCEDURE — 70551 MRI BRAIN EPILEPSY WITHOUT CONTRAST: ICD-10-PCS | Mod: 26,,, | Performed by: RADIOLOGY

## 2022-08-09 PROCEDURE — 70551 MRI BRAIN STEM W/O DYE: CPT | Mod: TC

## 2022-08-14 ENCOUNTER — HOSPITAL ENCOUNTER (EMERGENCY)
Facility: HOSPITAL | Age: 22
Discharge: HOME | End: 2022-08-14
Attending: FAMILY MEDICINE

## 2022-08-14 VITALS
RESPIRATION RATE: 18 BRPM | WEIGHT: 160 LBS | HEIGHT: 65 IN | BODY MASS INDEX: 26.66 KG/M2 | SYSTOLIC BLOOD PRESSURE: 141 MMHG | TEMPERATURE: 100 F | HEART RATE: 108 BPM | DIASTOLIC BLOOD PRESSURE: 69 MMHG | OXYGEN SATURATION: 98 %

## 2022-08-14 DIAGNOSIS — H11.31 SUBCONJUNCTIVAL HEMORRHAGE OF RIGHT EYE: ICD-10-CM

## 2022-08-14 DIAGNOSIS — S01.119A LACERATION OF EYELID WITHOUT INVOLVEMENT OF LID MARGIN: Primary | ICD-10-CM

## 2022-08-14 DIAGNOSIS — S00.83XA CONTUSION OF FACE, INITIAL ENCOUNTER: ICD-10-CM

## 2022-08-14 PROCEDURE — 12011 RPR F/E/E/N/L/M 2.5 CM/<: CPT | Mod: ER

## 2022-08-14 PROCEDURE — 90715 TDAP VACCINE 7 YRS/> IM: CPT | Mod: ER | Performed by: FAMILY MEDICINE

## 2022-08-14 PROCEDURE — 99284 EMERGENCY DEPT VISIT MOD MDM: CPT | Mod: 25,ER

## 2022-08-14 PROCEDURE — 90471 IMMUNIZATION ADMIN: CPT | Mod: ER | Performed by: FAMILY MEDICINE

## 2022-08-14 PROCEDURE — 63600175 PHARM REV CODE 636 W HCPCS: Mod: ER | Performed by: FAMILY MEDICINE

## 2022-08-14 PROCEDURE — 25000003 PHARM REV CODE 250: Mod: ER | Performed by: FAMILY MEDICINE

## 2022-08-14 RX ORDER — IBUPROFEN 800 MG/1
800 TABLET ORAL 3 TIMES DAILY PRN
Qty: 30 TABLET | Refills: 0 | OUTPATIENT
Start: 2022-08-14 | End: 2024-01-15

## 2022-08-14 RX ORDER — IBUPROFEN 400 MG/1
800 TABLET ORAL
Status: COMPLETED | OUTPATIENT
Start: 2022-08-14 | End: 2022-08-14

## 2022-08-14 RX ADMIN — TETANUS TOXOID, REDUCED DIPHTHERIA TOXOID AND ACELLULAR PERTUSSIS VACCINE, ADSORBED 0.5 ML: 5; 2.5; 8; 8; 2.5 SUSPENSION INTRAMUSCULAR at 07:08

## 2022-08-14 RX ADMIN — IBUPROFEN 800 MG: 400 TABLET, FILM COATED ORAL at 07:08
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
## 2022-08-14 NOTE — Clinical Note
"David Koenig" Neelam was seen and treated in our emergency department on 8/14/2022.  He may return to work on 08/15/2022.       If you have any questions or concerns, please don't hesitate to call.      Tess Iniguez RN    "
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
## 2022-08-14 NOTE — ED NOTES
Discharge and follow up instructions given, pt verbalized understanding.     Respirations even and non-labored. AAO x 3. NADN.

## 2022-12-13 NOTE — PSYCH
Environmental safety rounding complete. Room is free of environmental hazards. 1:1 staff at bedside maintaining direct visual observation.  
Environmental safety rounding complete. Room is free of environmental hazards. 1:1 staff at bedside maintaining direct visual observation.  
PEC patient resting in bed with sitter at bedside.  Q 15 minute rounds sheet reviewed.  Spoke with chaim Frausto who stated the nurse was made aware patient required a PEC foam meal tray, no other safety concerns at this time.  Environmental safety rounds completed.    
PEC/CEC patient resting in bed with sitter at bedside.  Q 15 minute rounds sheet reviewed.  Spoke with chaim Bergman who stated no safety concerns at this time.  Environmental safety rounds completed.    
PEC/CEC patient resting in bed with sitter at bedside.  Q 15 minute rounds sheet reviewed.  Spoke with chaim Floyd who stated no safety concerns at this time.  Environmental safety rounds completed.    
PEC/CEC patient resting in bed with sitter at bedside.  Q 15 minute rounds sheet reviewed.  Spoke with chaim Mak who stated no safety concerns at this time.  Environmental safety rounds completed.    
Safety round performed, sitter at the bedside. Safety of room ensured.   
Safety rounds completed. Sitter at bedside.  
Safety rounds completed. Sitter at bedside. Discussed findings with nurse.   
Safety rounds completed. Sitter at bedside. Findings discussed with charge nurse.   
Safety rounds performed. Sitter at the bedside, no safety issues at this time.   
Sitter at bedside. Safety rounds completed.  
Sitter at bedside. Safety rounds completed. Charge nurse informed of findings.   
Name band;

## 2023-01-20 NOTE — SUBJECTIVE & OBJECTIVE
"Interval History:     Patient has no subjective psychiatric complaints today. His memory appears to be impaired to some degree as he is unable to provide much information regarding recent events. However is oriented x4.  CAM-ICU negative currently for delirium. Denies AVH, paranoia at this time. Patient is unable to abstract proverbs. Attention is intact to testing. Some word finding difficulties noted. Denies ASE to Risperdal. Calm and cooperative during interview.    Received PRN Zyprexas 2.5 mg PO however reason is unclear and patient does not remember taking it.    Family History     Problem Relation (Age of Onset)    Heart attacks under age 50 Paternal Uncle    No Known Problems Mother, Father, Sister, Brother        Social History Main Topics    Smoking status: Never Smoker    Smokeless tobacco: Never Used    Alcohol use No    Drug use: No    Sexual activity: Not on file     Psychotherapeutics     Start     Stop Route Frequency Ordered    02/23/18 1630  risperiDONE tablet 0.5 mg      -- Oral 2 times daily 02/23/18 1624    02/19/18 1852  OLANZapine tablet 2.5 mg      -- Oral Every 8 hours PRN 02/19/18 1752           Review of Systems   Negative except as above.    Objective:     Vital Signs (Most Recent):  Temp: 98.6 °F (37 °C) (02/24/18 1225)  Pulse: (!) 112 (02/24/18 1225)  Resp: 18 (02/24/18 1225)  BP: (!) 107/59 (02/24/18 1225)  SpO2: 95 % (02/24/18 1225) Vital Signs (24h Range):  Temp:  [97.9 °F (36.6 °C)-98.6 °F (37 °C)] 98.6 °F (37 °C)  Pulse:  [] 112  Resp:  [12-18] 18  SpO2:  [95 %-98 %] 95 %  BP: ()/(52-75) 107/59     Height: 5' 7" (170.2 cm)  Weight: 55 kg (121 lb 4.1 oz)  Body mass index is 18.99 kg/m².    No intake or output data in the 24 hours ending 02/24/18 1436    Physical Exam       CONSTITUTIONAL  General Appearance: in scrubs, NAD, calm, cooperative    MUSCULOSKELETAL  Muscle Strength and Tone: no weakness, or spasticity  Abnormal Involuntary Movements: no dyskinesia, " "dystonia    PSYCHIATRIC   Level of Consciousness: alert  Orientation: oriented to person, place, date, month, year.  Grooming: fair  Psychomotor Behavior: no agitation, retardation  Speech: normal rate, volume, tone  Language: English, no asphasia  Mood: "ok"  Affect: constricted  Thought Process: mostly linear however at times tangential briefly  Thought Content: paranoia, AVH  Memory: impaired to some degree  Attention[facility] with no errors[facility]  Fund of Knowledge: appropriate for education level  Insight: limited  Judgment: limited    " Yes

## 2023-05-24 NOTE — SUBJECTIVE & OBJECTIVE
Subjective:     Interval History:   Patient agitated this morning. Started on risperidone 0.5mg last night and PRN zyprexa. Patient reported to be refusing meds and IV access this morning.     Current Neurological Medications:   keppra 500mg BID    Current Facility-Administered Medications   Medication Dose Route Frequency Provider Last Rate Last Dose    acetaminophen tablet 325 mg  325 mg Oral Q4H PRN Zion Ladd MD        diphenhydrAMINE injection 25 mg  25 mg Intravenous Q24H Zion Ladd MD        enoxaparin injection 40 mg  40 mg Subcutaneous Daily Gabrielle Castle MD   40 mg at 02/17/18 1726    haloperidol lactate injection 10 mg  10 mg Intravenous Once PRN Gabrielle Castle MD        haloperidol lactate injection 5 mg  5 mg Intramuscular Once Jessica Johnson MD        Immune Globulin G (IGG)-PRO-IGA 10 % injection (Privigen) 10 % injection 35 g  35 g Intravenous Q24H Zion Ladd MD        levETIRAcetam tablet 500 mg  500 mg Oral BID Epifanio Pleitez MD   Stopped at 02/19/18 0900    OLANZapine injection 5 mg  5 mg Intramuscular Once PRN Beatriz Solis MD        OLANZapine tablet 5 mg  5 mg Oral Daily PRN Gabrielle Castle MD        risperiDONE tablet 0.5 mg  0.5 mg Oral QHS Alicia Underwood MD   0.5 mg at 02/18/18 2136       Review of Systems  Objective:     Vital Signs (Most Recent):  Temp: 98.1 °F (36.7 °C) (02/19/18 1144)  Pulse: 107 (02/19/18 1144)  Resp: 18 (02/19/18 1144)  BP: (!) 107/58 (02/19/18 1144)  SpO2: 95 % (02/19/18 1144) Vital Signs (24h Range):  Temp:  [98.1 °F (36.7 °C)-98.6 °F (37 °C)] 98.1 °F (36.7 °C)  Pulse:  [] 107  Resp:  [17-18] 18  SpO2:  [95 %-99 %] 95 %  BP: (107-116)/(56-67) 107/58     Weight: 55 kg (121 lb 4.1 oz)  Body mass index is 18.99 kg/m².    Physical Exam         Significant Labs:   Recent Lab Results       02/19/18  0501 02/18/18  1310 02/18/18  1309      Appearance, CSF  Clear        Slightly bloody(A)       Bladder irritants can cause urinary frequency and urgency.  Avoid caffeine, citrus, tomatoes and artificial sweeteners to see how that affects your urination.     We are checking a pelvic ultrasound and culture.  You're calling [facility] for a pelvic exam and we'll go from there.    Try a daily antihistamine like Zyrtec.     Mono/Macrophage, CSF  4(L)        3(L)      Segmented Neutrophils, CSF  2        1      Heme [place]quot  1.0        1.0      WBC, CSF  7(H)        9(H)      RBC, CSF  7(A)        388(A)      Lymphs, CSF  94(H)        96(H)      Immature Granulocytes 0.4       Immature Grans (Abs) 0.03  Comment:  Mild elevation in immature granulocytes is non specific and   can be seen in a variety of conditions including stress response,   acute inflammation, trauma and pregnancy. Correlation with other   laboratory and clinical findings is essential.         Anion Gap 12       Baso # 0.03       Basophil% 0.4       BUN, Bld 17       Calcium 9.5       Chloride 105       CO2 22(L)       Color, CSF  Colorless        Xanthochromic  Comment:  supernate xanthochromic upon spining(A)      Creatinine 0.9       Crypto Ag, CSF  Negative      CSF CULTURE  No Growth to date[P]      Differential Method Automated       eGFR if  >60.0       eGFR if non  >60.0  Comment:  Calculation used to obtain the estimated glomerular filtration  rate (eGFR) is the CKD-EPI equation.          Eos # 0.1       Eosinophil% 1.3       Glucose 56(L)       Glucose, CSF  49  Comment:  Infants: 60 to 80 mg/dL      Gram Stain Result  Rare WBC's        No organisms seen      Gran # (ANC) 5.1       Gran% 73.2(H)       Hematocrit 37.2(L)       Hemoglobin 12.1(L)       IgA   275  Comment:  IgA Cord Blood Reference Range: <5 mg/dL.     Lymph # 1.2       Lymph% 17.0(L)       Magnesium 2.2       MCH 26.3(L)       MCHC 32.5       MCV 81(L)       Mono # 0.5       Mono% 7.7       MPV 9.0(L)       nRBC 0       Platelets 361(H)       Potassium 4.6       Protein, CSF  37  Comment:  Infants can have higher CSF protein results due to increased  permeability of the blood-brain barrier.        RBC 4.60       RDW 12.4       Sodium 139       WBC 6.99             Significant Imaging:        MRI Brain W WO contrast (2/17/18)  Mildly motion degraded examination was  no significant abnormality.

## 2023-07-24 ENCOUNTER — HOSPITAL ENCOUNTER (EMERGENCY)
Facility: HOSPITAL | Age: 23
Discharge: HOME | End: 2023-07-24
Attending: EMERGENCY MEDICINE
Payer: COMMERCIAL

## 2023-07-24 VITALS
BODY MASS INDEX: 25.33 KG/M2 | TEMPERATURE: 102 F | HEART RATE: 118 BPM | WEIGHT: 152 LBS | RESPIRATION RATE: 18 BRPM | SYSTOLIC BLOOD PRESSURE: 102 MMHG | OXYGEN SATURATION: 97 % | HEIGHT: 65 IN | DIASTOLIC BLOOD PRESSURE: 55 MMHG

## 2023-07-24 DIAGNOSIS — J02.0 STREP PHARYNGITIS: Primary | ICD-10-CM

## 2023-07-24 DIAGNOSIS — R50.9 FEVER, UNSPECIFIED FEVER CAUSE: ICD-10-CM

## 2023-07-24 LAB
GROUP A STREP, MOLECULAR: POSITIVE
SARS-COV-2 RDRP RESP QL NAA+PROBE: NEGATIVE

## 2023-07-24 PROCEDURE — U0002 COVID-19 LAB TEST NON-CDC: HCPCS | Mod: ER | Performed by: EMERGENCY MEDICINE

## 2023-07-24 PROCEDURE — 87651 STREP A DNA AMP PROBE: CPT | Mod: ER | Performed by: EMERGENCY MEDICINE

## 2023-07-24 PROCEDURE — 99283 EMERGENCY DEPT VISIT LOW MDM: CPT | Mod: ER

## 2023-07-24 PROCEDURE — 25000003 PHARM REV CODE 250: Mod: ER | Performed by: EMERGENCY MEDICINE

## 2023-07-24 RX ORDER — IBUPROFEN 600 MG/1
600 TABLET ORAL
Status: COMPLETED | OUTPATIENT
Start: 2023-07-24 | End: 2023-07-24

## 2023-07-24 RX ORDER — PENICILLIN V POTASSIUM 250 MG/1
500 TABLET, FILM COATED ORAL
Status: COMPLETED | OUTPATIENT
Start: 2023-07-24 | End: 2023-07-24

## 2023-07-24 RX ORDER — PENICILLIN V POTASSIUM 250 MG/5ML
500 POWDER, FOR SOLUTION ORAL 2 TIMES DAILY
Qty: 200 ML | Refills: 0 | Status: SHIPPED | OUTPATIENT
Start: 2023-07-24 | End: 2023-08-03

## 2023-07-24 RX ORDER — PENICILLIN V POTASSIUM 250 MG/5ML
500 POWDER, FOR SOLUTION ORAL 2 TIMES DAILY
Qty: 200 ML | Refills: 0 | Status: SHIPPED | OUTPATIENT
Start: 2023-07-24 | End: 2023-07-24 | Stop reason: SDUPTHER

## 2023-07-24 RX ADMIN — PENICILLIN V POTASSIUM 500 MG: 250 TABLET, FILM COATED ORAL at 11:07

## 2023-07-24 RX ADMIN — IBUPROFEN 600 MG: 600 TABLET, FILM COATED ORAL at 11:07

## 2023-07-24 NOTE — Clinical Note
"David Koenig" Neelam was seen and treated in our emergency department on 7/24/2023.  He may return to work on 07/26/2023.       If you have any questions or concerns, please don't hesitate to call.      Mary LEON    "

## 2023-07-25 NOTE — ED PROVIDER NOTES
ED Provider Note - 7/24/2023    History     Chief Complaint   Patient presents with    Cough     PT to the ED with C/O non productive cough, body aches, headache, nausea, and sore throat since this morning. Denies chest pain , SOB, or diarrhea. PT states he feels hot, did not check temp at home. Nyquil taken 1 hr PTA.      Patient currently presents with complaint of sore throat.  This is localized to the bilateral oropharynx.  Onset was first noted this AM.  There is associated fever.  Tenderness is noted in the neck.  Patient does and does not report rare associated cough. But denies SOB or congestion.  Nyquil taken at home earlier this PM.        Review of patient's allergies indicates:   Allergen Reactions    Iodine Rash    Iodine and iodide containing products Rash     Past Medical History:   Diagnosis Date    Allergy     Asthma     Seizures      Past Surgical History:   Procedure Laterality Date    INNER EAR SURGERY      glass shard in ear removed     Family History   Problem Relation Age of Onset    No Known Problems Mother     No Known Problems Father     No Known Problems Sister     No Known Problems Brother     Heart attacks under age 50 Paternal Uncle      Social History     Tobacco Use    Smoking status: Some Days     Types: Vaping w/o nicotine    Smokeless tobacco: Never   Substance Use Topics    Alcohol use: No    Drug use: No     Review of Systems   Constitutional:  Positive for fatigue and fever. Negative for chills.   HENT:  Positive for sore throat. Negative for congestion.    Respiratory:  Positive for cough. Negative for chest tightness and shortness of breath.    Cardiovascular:  Negative for chest pain and palpitations.   Gastrointestinal:  Negative for abdominal pain and vomiting.   Genitourinary:  Negative for difficulty urinating and dysuria.   Musculoskeletal:  Positive for myalgias.   Skin:  Negative for color change and rash.   Neurological:  Negative for weakness and numbness.   All  other systems reviewed and are negative.    Physical Exam     Initial Vitals [07/24/23 2258]   BP Pulse Resp Temp SpO2   (!) 102/55 (!) 118 18 (!) 101.8 °F (38.8 °C) 97 %      MAP       --         Physical Exam    Nursing note and vitals reviewed.  Constitutional: He appears well-developed and well-nourished. He is not diaphoretic. No distress.   HENT:   Head: Normocephalic and atraumatic.   Right Ear: External ear normal.   Left Ear: External ear normal.   Mouth/Throat: Uvula is midline and mucous membranes are normal. Posterior oropharyngeal edema and posterior oropharyngeal erythema present. No oropharyngeal exudate or tonsillar abscesses.   Eyes: Conjunctivae are normal. No scleral icterus.   Neck: Neck supple. No JVD present.   Cardiovascular:  Normal rate, regular rhythm, normal heart sounds and intact distal pulses.           Pulmonary/Chest: Breath sounds normal. No respiratory distress.   Abdominal: Abdomen is soft. There is no abdominal tenderness.   Musculoskeletal:         General: No edema. Normal range of motion.      Cervical back: Neck supple.     Neurological: He is alert and oriented to person, place, and time. He has normal strength.   Skin: Skin is warm and dry.       ED Course   Procedures                   MDM  Differential Diagnoses   Based on available history, the working differential diagnoses considered during this evaluation include but are not limited to  strep pharyngitis, other bacterial pharyngitis, viral pharyngitis, viral upper respiratory illness .      LABS     Labs Reviewed   GROUP A STREP, MOLECULAR - Abnormal; Notable for the following components:       Result Value    Group A Strep, Molecular Positive (*)     All other components within normal limits   SARS-COV-2 RNA AMPLIFICATION, QUAL    Narrative:     Is the patient symptomatic?->Yes     All available results from the labs ordered were independently reviewed.  CoVid Screen negative and Strep Screen positive    Imaging      Imaging Results    None            EKG        ED Management/Discussion     Medications   ibuprofen tablet 600 mg (600 mg Oral Given 7/24/23 2342)   penicillin v potassium tablet 500 mg (500 mg Oral Given 7/24/23 2342)                   The patient's list of active medical problems, social history, medications, and allergies as documented per RN staff has been reviewed.                 On final assessment, the patient appears well and comfortable for discharge.  Positive strep screen discussed with the patient.  We have discussed the indication for initiation of antibiotics.  I see no indication of an emergent process beyond that addressed during our encounter but have duly counseled the patient/family regarding the need for prompt follow-up as well as the indications that should prompt immediate return to the emergency room should new or worrisome developments occur.  The patient/family has been provided with verbal and printed direction regarding our final diagnosis(es) as well as instructions regarding use of OTC and/or Rx medications intended to manage the patient's aforementioned conditions including:  ED Prescriptions       Medication Sig Dispense Start Date End Date Auth. Provider    penicillin v potassium (VEETID) 250 mg/5 mL SolR  (Status: Discontinued) Take 10 mLs (500 mg total) by mouth 2 (two) times daily. for 10 days 200 mL 7/24/2023 7/24/2023 Jorge A Lambert MD    penicillin v potassium (VEETID) 250 mg/5 mL SolR Take 10 mLs (500 mg total) by mouth 2 (two) times daily. for 10 days 200 mL 7/24/2023 8/3/2023 Jorge A Lambert MD              Patient has been advised of the following recommended follow-up instructions:  Follow-up Information       Follow up With Specialties Details Why Contact Info    PCP  Schedule an appointment as soon as possible for a visit  for reassessment     [facility] Emergency Dept Emergency Medicine Go to  As needed, If symptoms worsen [address] [facility]  [place] [id number] [phone]          The patient/family communicates understanding of all this information and all remaining questions and concerns were addressed at this time.      Referrals:  No orders of the defined types were placed in this encounter.      CLINICAL IMPRESSION    ICD-10-CM ICD-9-CM   1. Strep pharyngitis  J02.0 034.0   2. Fever, unspecified fever cause  R50.9 780.60          ED Disposition Condition    Discharge Stable               Jorge A Lambert MD  07/25/23 0112       Jorge A Lambert MD  07/25/23 0113

## 2023-11-09 ENCOUNTER — HOSPITAL ENCOUNTER (EMERGENCY)
Facility: HOSPITAL | Age: 23
Discharge: HOME | End: 2023-11-09
Attending: STUDENT IN AN ORGANIZED HEALTH CARE EDUCATION/TRAINING PROGRAM

## 2023-11-09 VITALS
DIASTOLIC BLOOD PRESSURE: 74 MMHG | HEART RATE: 84 BPM | RESPIRATION RATE: 16 BRPM | OXYGEN SATURATION: 99 % | WEIGHT: 147 LBS | SYSTOLIC BLOOD PRESSURE: 138 MMHG | BODY MASS INDEX: 24.46 KG/M2 | TEMPERATURE: 99 F

## 2023-11-09 DIAGNOSIS — R55 SYNCOPE, UNSPECIFIED SYNCOPE TYPE: Primary | ICD-10-CM

## 2023-11-09 LAB
ALBUMIN SERPL BCP-MCNC: 3.9 G/DL (ref 3.5–5.2)
ALP SERPL-CCNC: 60 U/L (ref 55–135)
ALT SERPL W/O P-5'-P-CCNC: 24 U/L (ref 10–44)
AMPHET+METHAMPHET UR QL: NEGATIVE
ANION GAP SERPL CALC-SCNC: 0 MMOL/L (ref 3–11)
AST SERPL-CCNC: 16 U/L (ref 10–40)
BACTERIA #/AREA URNS HPF: NEGATIVE /HPF
BARBITURATES UR QL SCN>200 NG/ML: NEGATIVE
BASOPHILS # BLD AUTO: 0.04 K/UL (ref 0–0.2)
BASOPHILS NFR BLD: 0.5 % (ref 0–1.9)
BENZODIAZ UR QL SCN>200 NG/ML: NEGATIVE
BILIRUB SERPL-MCNC: 0.3 MG/DL (ref 0.1–1)
BILIRUB UR QL STRIP: NEGATIVE
BUN SERPL-MCNC: 7 MG/DL (ref 6–20)
BZE UR QL SCN: NEGATIVE
CALCIUM SERPL-MCNC: 8.8 MG/DL (ref 8.7–10.5)
CANNABINOIDS UR QL SCN: NEGATIVE
CHLORIDE SERPL-SCNC: 111 MMOL/L (ref 95–110)
CLARITY UR: CLEAR
CO2 SERPL-SCNC: 29 MMOL/L (ref 23–29)
COLOR UR: YELLOW
CREAT SERPL-MCNC: 1.1 MG/DL (ref 0.5–1.4)
CREAT UR-MCNC: 116 MG/DL (ref 23–375)
DIFFERENTIAL METHOD: ABNORMAL
EOSINOPHIL # BLD AUTO: 0.5 K/UL (ref 0–0.5)
EOSINOPHIL NFR BLD: 6.4 % (ref 0–8)
ERYTHROCYTE [DISTWIDTH] IN BLOOD BY AUTOMATED COUNT: 12.3 % (ref 11.5–14.5)
EST. GFR  (NO RACE VARIABLE): >60 ML/MIN/1.73 M^2
GLUCOSE SERPL-MCNC: 92 MG/DL (ref 70–110)
GLUCOSE UR QL STRIP: NEGATIVE
HCT VFR BLD AUTO: 41 % (ref 40–54)
HGB BLD-MCNC: 13.6 G/DL (ref 14–18)
HGB UR QL STRIP: NEGATIVE
HYALINE CASTS #/AREA URNS LPF: 0 /LPF
IMM GRANULOCYTES # BLD AUTO: 0.01 K/UL (ref 0–0.04)
IMM GRANULOCYTES NFR BLD AUTO: 0.1 % (ref 0–0.5)
KETONES UR QL STRIP: NEGATIVE
LEUKOCYTE ESTERASE UR QL STRIP: ABNORMAL
LIPASE SERPL-CCNC: 31 U/L (ref 13–75)
LYMPHOCYTES # BLD AUTO: 2.3 K/UL (ref 1–4.8)
LYMPHOCYTES NFR BLD: 30.6 % (ref 18–48)
MCH RBC QN AUTO: 27.2 PG (ref 27–31)
MCHC RBC AUTO-ENTMCNC: 33.2 G/DL (ref 32–36)
MCV RBC AUTO: 82 FL (ref 82–98)
METHADONE UR QL SCN>300 NG/ML: NEGATIVE
MICROSCOPIC COMMENT: ABNORMAL
MONOCYTES # BLD AUTO: 1.1 K/UL (ref 0.3–1)
MONOCYTES NFR BLD: 14.7 % (ref 4–15)
NEUTROPHILS # BLD AUTO: 3.6 K/UL (ref 1.8–7.7)
NEUTROPHILS NFR BLD: 47.7 % (ref 38–73)
NITRITE UR QL STRIP: NEGATIVE
NRBC BLD-RTO: 0 /100 WBC
OPIATES UR QL SCN: NEGATIVE
PCP UR QL SCN>25 NG/ML: NEGATIVE
PH UR STRIP: 6 [PH] (ref 5–8)
PLATELET # BLD AUTO: 286 K/UL (ref 150–450)
PMV BLD AUTO: 8.8 FL (ref 9.2–12.9)
POTASSIUM SERPL-SCNC: 3.9 MMOL/L (ref 3.5–5.1)
PROT SERPL-MCNC: 7.5 G/DL (ref 6–8.4)
PROT UR QL STRIP: NEGATIVE
RBC # BLD AUTO: 5 M/UL (ref 4.6–6.2)
RBC #/AREA URNS HPF: 0 /HPF (ref 0–4)
SODIUM SERPL-SCNC: 140 MMOL/L (ref 136–145)
SP GR UR STRIP: 1.01 (ref 1–1.03)
SQUAMOUS #/AREA URNS HPF: 0 /HPF
TOXICOLOGY INFORMATION: NORMAL
URN SPEC COLLECT METH UR: ABNORMAL
UROBILINOGEN UR STRIP-ACNC: 1 EU/DL
WBC # BLD AUTO: 7.46 K/UL (ref 3.9–12.7)
WBC #/AREA URNS HPF: 8 /HPF (ref 0–5)

## 2023-11-09 PROCEDURE — 80053 COMPREHEN METABOLIC PANEL: CPT | Performed by: CLINICAL NURSE SPECIALIST

## 2023-11-09 PROCEDURE — 83690 ASSAY OF LIPASE: CPT | Performed by: CLINICAL NURSE SPECIALIST

## 2023-11-09 PROCEDURE — 85025 COMPLETE CBC W/AUTO DIFF WBC: CPT | Performed by: CLINICAL NURSE SPECIALIST

## 2023-11-09 PROCEDURE — 36415 COLL VENOUS BLD VENIPUNCTURE: CPT | Performed by: CLINICAL NURSE SPECIALIST

## 2023-11-09 PROCEDURE — 99283 EMERGENCY DEPT VISIT LOW MDM: CPT

## 2023-11-09 PROCEDURE — 81000 URINALYSIS NONAUTO W/SCOPE: CPT | Mod: 59 | Performed by: CLINICAL NURSE SPECIALIST

## 2023-11-09 PROCEDURE — 80307 DRUG TEST PRSMV CHEM ANLYZR: CPT | Performed by: CLINICAL NURSE SPECIALIST

## 2023-11-09 RX ORDER — ONDANSETRON 4 MG/1
4 TABLET, ORALLY DISINTEGRATING ORAL EVERY 6 HOURS PRN
Qty: 10 TABLET | Refills: 0 | Status: SHIPPED | OUTPATIENT
Start: 2023-11-09

## 2023-11-10 NOTE — ED PROVIDER NOTES
"Encounter Date: 11/9/2023       History     Chief Complaint   Patient presents with    Vomiting     Pt stated that prior to arrival, he began experiencing abdominal pain that caused him to "convulse and was later told that he passed out" and then vomited after. Presents to ED with complaint of fatigue.      22-year-old male presents emergency room with syncopal episode prior to arrival.  While he was lying down he had a syncopal episode.  Patient states he had a syncopal episode when he was lying down.  Patient reports severe abdominal pain which caused a "seizure".  Patient has history of seizures but is currently not on any seizure medications.  Vomited x1 after incident          Past Medical History:   Diagnosis Date    Allergy     Asthma     Seizures      Past Surgical History:   Procedure Laterality Date    INNER EAR SURGERY      glass shard in ear removed     Family History   Problem Relation Age of Onset    No Known Problems Mother     No Known Problems Father     No Known Problems Sister     No Known Problems Brother     Heart attacks under age 50 Paternal Uncle      Social History     Tobacco Use    Smoking status: Some Days     Types: Vaping w/o nicotine    Smokeless tobacco: Never   Substance Use Topics    Alcohol use: No    Drug use: No     Review of Systems   Constitutional:  Positive for fatigue. Negative for fever.   HENT:  Negative for sore throat.    Respiratory:  Negative for shortness of breath.    Cardiovascular:  Negative for chest pain.   Gastrointestinal:  Positive for vomiting. Negative for nausea.   Genitourinary:  Negative for dysuria.   Musculoskeletal:  Negative for back pain.   Skin:  Negative for rash.   Neurological:  Positive for weakness.   Hematological:  Does not bruise/bleed easily.   All other systems reviewed and are negative.      Physical Exam     Initial Vitals [11/09/23 1949]   BP Pulse Resp Temp SpO2   138/74 84 16 99.3 °F (37.4 °C) 99 %      MAP       --         Physical " Exam    Nursing note and vitals reviewed.  Constitutional: He appears well-developed and well-nourished.   HENT:   Head: Normocephalic and atraumatic.   Eyes: Pupils are equal, round, and reactive to light.   Cardiovascular:  Normal rate and regular rhythm.           Pulmonary/Chest: Breath sounds normal.   Abdominal: Abdomen is soft. Bowel sounds are normal.   Musculoskeletal:         General: Normal range of motion.     Neurological: He is alert and oriented to person, place, and time.   Psychiatric: He has a normal mood and affect.         ED Course   Procedures  Labs Reviewed   CBC W/ AUTO DIFFERENTIAL - Abnormal; Notable for the following components:       Result Value    Hemoglobin 13.6 (*)     MPV 8.8 (*)     Mono # 1.1 (*)     All other components within normal limits   COMPREHENSIVE METABOLIC PANEL - Abnormal; Notable for the following components:    Chloride 111 (*)     Anion Gap 0 (*)     All other components within normal limits   URINALYSIS, REFLEX TO URINE CULTURE - Abnormal; Notable for the following components:    Leukocytes, UA Trace (*)     All other components within normal limits    Narrative:     Preferred Collection Type->Urine, Clean Catch  Specimen Source->Urine   URINALYSIS MICROSCOPIC - Abnormal; Notable for the following components:    WBC, UA 8 (*)     Hyaline Casts, UA 0.0 (*)     All other components within normal limits    Narrative:     Preferred Collection Type->Urine, Clean Catch  Specimen Source->Urine   LIPASE   DRUG SCREEN PANEL, URINE EMERGENCY    Narrative:     Preferred Collection Type->Urine, Clean Catch  Specimen Source->Urine          Imaging Results    None          Medications - No data to display  Medical Decision Making  Amount and/or Complexity of Data Reviewed  Labs: ordered.    Risk  Prescription drug management.                               Clinical Impression:   Final diagnoses:  [R55] Syncope, unspecified syncope type (Primary)        ED Disposition Condition     Discharge Stable          ED Prescriptions       Medication Sig Dispense Start Date End Date Auth. Provider    ondansetron (ZOFRAN-ODT) 4 MG TbDL Take 1 tablet (4 mg total) by mouth every 6 (six) hours as needed. 10 tablet 11/9/2023 -- Erin Cooper, RICHAR          Follow-up Information       Follow up With Specialties Details Why Contact Info    Renan Choi MD Family Medicine  As needed [address] [place] [address] [id number] [phone]               Erin Cooper, NP  11/13/23 1743

## 2023-12-02 ENCOUNTER — HOSPITAL ENCOUNTER (EMERGENCY)
Facility: HOSPITAL | Age: 23
Discharge: HOME | End: 2023-12-02
Attending: EMERGENCY MEDICINE

## 2023-12-02 VITALS
DIASTOLIC BLOOD PRESSURE: 64 MMHG | WEIGHT: 145 LBS | HEIGHT: 65 IN | TEMPERATURE: 99 F | HEART RATE: 76 BPM | BODY MASS INDEX: 24.16 KG/M2 | RESPIRATION RATE: 18 BRPM | SYSTOLIC BLOOD PRESSURE: 109 MMHG | OXYGEN SATURATION: 98 %

## 2023-12-02 DIAGNOSIS — Z71.1 CONCERN ABOUT STD IN MALE WITHOUT DIAGNOSIS: Primary | ICD-10-CM

## 2023-12-02 PROCEDURE — 99281 EMR DPT VST MAYX REQ PHY/QHP: CPT

## 2024-01-15 ENCOUNTER — HOSPITAL ENCOUNTER (EMERGENCY)
Facility: HOSPITAL | Age: 24
Discharge: HOME | End: 2024-01-15
Attending: FAMILY MEDICINE

## 2024-01-15 VITALS
RESPIRATION RATE: 18 BRPM | TEMPERATURE: 99 F | DIASTOLIC BLOOD PRESSURE: 75 MMHG | BODY MASS INDEX: 24.99 KG/M2 | HEART RATE: 81 BPM | OXYGEN SATURATION: 98 % | HEIGHT: 65 IN | WEIGHT: 150 LBS | SYSTOLIC BLOOD PRESSURE: 121 MMHG

## 2024-01-15 DIAGNOSIS — J02.9 VIRAL PHARYNGITIS: Primary | ICD-10-CM

## 2024-01-15 LAB
GROUP A STREP, MOLECULAR: NEGATIVE
INFLUENZA A, MOLECULAR: NEGATIVE
INFLUENZA B, MOLECULAR: NEGATIVE
SARS-COV-2 RDRP RESP QL NAA+PROBE: NEGATIVE
SPECIMEN SOURCE: NORMAL

## 2024-01-15 PROCEDURE — 87651 STREP A DNA AMP PROBE: CPT | Mod: ER | Performed by: NURSE PRACTITIONER

## 2024-01-15 PROCEDURE — U0002 COVID-19 LAB TEST NON-CDC: HCPCS | Mod: ER | Performed by: NURSE PRACTITIONER

## 2024-01-15 PROCEDURE — 87502 INFLUENZA DNA AMP PROBE: CPT | Mod: ER | Performed by: NURSE PRACTITIONER

## 2024-01-15 PROCEDURE — 99283 EMERGENCY DEPT VISIT LOW MDM: CPT | Mod: ER

## 2024-01-15 RX ORDER — IBUPROFEN 400 MG/1
800 TABLET ORAL
Status: DISCONTINUED | OUTPATIENT
Start: 2024-01-15 | End: 2024-01-15 | Stop reason: HOSPADM

## 2024-01-15 RX ORDER — IBUPROFEN 800 MG/1
800 TABLET ORAL EVERY 6 HOURS PRN
Qty: 20 TABLET | Refills: 0 | Status: SHIPPED | OUTPATIENT
Start: 2024-01-15

## 2024-01-15 RX ORDER — PREDNISONE 20 MG/1
60 TABLET ORAL
Status: DISCONTINUED | OUTPATIENT
Start: 2024-01-15 | End: 2024-01-15 | Stop reason: HOSPADM

## 2024-01-15 NOTE — ED PROVIDER NOTES
Encounter Date: 1/15/2024       History     Chief Complaint   Patient presents with    Sore Throat     Sore throat x 1 week     23-year-old male complains of sore throat for last 1 week. Subjective fever.  No cough or congestion.    The history is provided by the patient.     Review of patient's allergies indicates:   Allergen Reactions    Iodine Rash    Iodine and iodide containing products Rash     Past Medical History:   Diagnosis Date    Allergy     Asthma     Seizures      Past Surgical History:   Procedure Laterality Date    INNER EAR SURGERY      glass shard in ear removed     Family History   Problem Relation Age of Onset    No Known Problems Mother     No Known Problems Father     No Known Problems Sister     No Known Problems Brother     Heart attacks under age 50 Paternal Uncle      Social History     Tobacco Use    Smoking status: Some Days     Types: Vaping w/o nicotine    Smokeless tobacco: Never   Substance Use Topics    Alcohol use: No    Drug use: No     Review of Systems   HENT:  Positive for sore throat.    All other systems reviewed and are negative.      Physical Exam     Initial Vitals [01/15/24 1033]   BP Pulse Resp Temp SpO2   121/75 81 18 98.5 °F (36.9 °C) 98 %      MAP       --         Physical Exam    Nursing note and vitals reviewed.  Constitutional: Vital signs are normal. He appears well-developed and well-nourished. He is active. No distress.   HENT:   Head: Normocephalic.   Nose: Nose normal.   Mouth/Throat: Oropharynx is clear and moist and mucous membranes are normal.   Mild posterior pharyngeal erythema no pus or exudate   Eyes: Conjunctivae, EOM and lids are normal.   Neck: Neck supple.   Normal range of motion.  Cardiovascular:  Normal rate, regular rhythm, S1 normal, S2 normal and normal heart sounds.           Pulmonary/Chest: Breath sounds normal. No respiratory distress. He has no wheezes. He has no rales.   Abdominal: Abdomen is soft. Bowel sounds are normal.    Musculoskeletal:      Right upper arm: Normal.      Left upper arm: Normal.      Cervical back: Normal range of motion and neck supple.      Right lower leg: Normal.      Left lower leg: Normal.     Neurological: He is alert and oriented to person, place, and time. He has normal strength. GCS score is 15. GCS eye subscore is 4. GCS verbal subscore is 5. GCS motor subscore is 6.   Skin: Skin is warm. Capillary refill takes less than 2 seconds.   Psychiatric: He has a normal mood and affect. His speech is normal and behavior is normal. Thought content normal. Cognition and memory are normal.         ED Course   Procedures  Labs Reviewed   GROUP A STREP, MOLECULAR   INFLUENZA A & B BY MOLECULAR   SARS-COV-2 RNA AMPLIFICATION, QUAL    Narrative:     Is the patient symptomatic?->Yes          Imaging Results    None          Medications   ibuprofen tablet 800 mg (has no administration in time range)   predniSONE tablet 60 mg (has no administration in time range)     Medical Decision Making  Differential diagnosis include pharyngitis viral versus bacterial, URI  Influenza COVID and strep negative  Patient is given ibuprofen and prednisone in ED along with prescription for ibuprofen.  Advised for salt water gargling.  Follow up PCP/ED with any worsening symptoms.    Amount and/or Complexity of Data Reviewed  Labs: ordered. Decision-making details documented in ED Course.     Details: Influenza, COVID and strep are negative    Risk  Prescription drug management.                                      Clinical Impression:  Final diagnoses:  [J02.9] Viral pharyngitis (Primary)          ED Disposition Condition    Discharge Stable          ED Prescriptions       Medication Sig Dispense Start Date End Date Auth. Provider    ibuprofen (ADVIL,MOTRIN) 800 MG tablet Take 1 tablet (800 mg total) by mouth every 6 (six) hours as needed for Pain. 20 tablet 1/15/2024 -- Keagan Devine MD          Follow-up Information       Follow  up With Specialties Details Why Contact Info    Renan Choi MD Family Medicine   [address] [place] [id number]  [phone]               Keagan Devine MD  01/15/24 9168

## 2024-01-15 NOTE — FIRST PROVIDER EVALUATION
Emergency Department TeleTriage Encounter Note      CHIEF COMPLAINT    Chief Complaint   Patient presents with    Sore Throat     Sore throat x 1 week       VITAL SIGNS   Initial Vitals [01/15/24 1033]   BP Pulse Resp Temp SpO2   121/75 81 18 98.5 °F (36.9 °C) 98 %      MAP       --            ALLERGIES    Review of patient's allergies indicates:   Allergen Reactions    Iodine Rash    Iodine and iodide containing products Rash       PROVIDER TRIAGE NOTE  23 year old male presents to the ER with complaints of sore throat, body aches, intermittent frontal headaches, and occasional cough x 1 week. No known fever. Able to eat and drink normally. Hx of seizures. Not currently taking any prescribed medications.    AAOx3, respirations even and non- labored, stable vitals, normal coloration of skin, sitting upright in triage chair, appears in no acute distress.          ORDERS  Labs Reviewed - No data to display    ED Orders (720h ago, onward)      None              Virtual Visit Note: The provider triage portion of this emergency department evaluation and documentation was performed via [employer], a HIPAA-compliant telemedicine application, in concert with a tele-presenter in the room. A face to face patient evaluation with one of my colleagues will occur once the patient is placed in an emergency department room.      DISCLAIMER: This note was prepared with [employer] voice recognition transcription software. Garbled syntax, mangled pronouns, and other bizarre constructions may be attributed to that software system.

## 2025-07-23 NOTE — ED PROVIDER NOTES
"Encounter Date: 12/2/2023       History     Chief Complaint   Patient presents with    Exposure to STD     Pt stated that he was treated for STD at a clinic on Thursday. Presents to ED with continued yellow penile discharge.      23 yo male here with complaint of urethral discharge onset several days ago. Seen and tested at STD clinic Thursday with "a shot and some pills." Testing pending. No new complaints. Concerned his discharge has not resolved yet.       Review of patient's allergies indicates:   Allergen Reactions    Iodine Rash    Iodine and iodide containing products Rash     Past Medical History:   Diagnosis Date    Allergy     Asthma     Seizures      Past Surgical History:   Procedure Laterality Date    INNER EAR SURGERY      glass shard in ear removed     Family History   Problem Relation Age of Onset    No Known Problems Mother     No Known Problems Father     No Known Problems Sister     No Known Problems Brother     Heart attacks under age 50 Paternal Uncle      Social History     Tobacco Use    Smoking status: Some Days     Types: Vaping w/o nicotine    Smokeless tobacco: Never   Substance Use Topics    Alcohol use: No    Drug use: No     Review of Systems   Constitutional: Negative.    Respiratory: Negative.     Cardiovascular: Negative.    Gastrointestinal: Negative.    Genitourinary:  Positive for penile discharge.   All other systems reviewed and are negative.      Physical Exam     Initial Vitals [12/02/23 1029]   BP Pulse Resp Temp SpO2   109/64 76 18 98.6 °F (37 °C) 98 %      MAP       --         Physical Exam    Nursing note and vitals reviewed.  Constitutional: He appears well-developed and well-nourished. He is not diaphoretic. No distress.   HENT:   Head: Normocephalic and atraumatic.   Eyes: EOM are normal. Pupils are equal, round, and reactive to light.   Neck: Neck supple.   Normal range of motion.  Cardiovascular:  Normal rate, regular rhythm and intact distal pulses.         " Addended by: PATRICK AGUIRRE on: 7/23/2025 05:50 PM     Modules accepted: Orders       Pulmonary/Chest: Breath sounds normal. No respiratory distress. He has no wheezes. He has no rales.   Abdominal: Abdomen is soft. Bowel sounds are normal. He exhibits no distension. There is no abdominal tenderness. There is no rebound.   Musculoskeletal:         General: No tenderness or edema. Normal range of motion.      Cervical back: Normal range of motion and neck supple.     Neurological: He is alert and oriented to person, place, and time.   Skin: Skin is warm and dry. Capillary refill takes less than 2 seconds.   Psychiatric: He has a normal mood and affect. Thought content normal.         ED Course   Procedures  Labs Reviewed - No data to display       Imaging Results    None          Medications - No data to display  Medical Decision Making  Likely treated for GC/CT based on history with testing pending. Not atypical that discharge persists less than 48 hours following treatment. Counseled to avoid intercourse x 2 weeks and follow up with STD clinic for results and further treatment as indicated. No emergent medical condition at this time.     Problems Addressed:  Concern about STD in male without diagnosis: acute illness or injury                                      Clinical Impression:  Final diagnoses:  [Z71.1] Concern about STD in male without diagnosis (Primary)          ED Disposition Condition    Discharge Stable          ED Prescriptions    None       Follow-up Information       Follow up With Specialties Details Why Contact Info    Renan Choi MD Family Medicine Schedule an appointment as soon as possible for a visit   [address] [place] [address] [id number] [phone]               Micha Madison MD  12/02/23 6415